# Patient Record
Sex: MALE | Race: WHITE
[De-identification: names, ages, dates, MRNs, and addresses within clinical notes are randomized per-mention and may not be internally consistent; named-entity substitution may affect disease eponyms.]

---

## 2019-12-19 ENCOUNTER — HOSPITAL ENCOUNTER (INPATIENT)
Dept: HOSPITAL 26 - MED | Age: 74
LOS: 4 days | Discharge: HOME | DRG: 720 | End: 2019-12-23
Attending: GENERAL PRACTICE | Admitting: GENERAL PRACTICE
Payer: MEDICAID

## 2019-12-19 VITALS — WEIGHT: 170 LBS | BODY MASS INDEX: 27.32 KG/M2 | HEIGHT: 66 IN

## 2019-12-19 VITALS — DIASTOLIC BLOOD PRESSURE: 88 MMHG | SYSTOLIC BLOOD PRESSURE: 133 MMHG

## 2019-12-19 VITALS — DIASTOLIC BLOOD PRESSURE: 34 MMHG | SYSTOLIC BLOOD PRESSURE: 72 MMHG

## 2019-12-19 DIAGNOSIS — G62.9: ICD-10-CM

## 2019-12-19 DIAGNOSIS — A41.9: Primary | ICD-10-CM

## 2019-12-19 DIAGNOSIS — I48.91: ICD-10-CM

## 2019-12-19 DIAGNOSIS — D68.9: ICD-10-CM

## 2019-12-19 DIAGNOSIS — T45.515A: ICD-10-CM
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
## 2019-12-19 DIAGNOSIS — Z87.891: ICD-10-CM

## 2019-12-19 DIAGNOSIS — E83.41: ICD-10-CM

## 2019-12-19 DIAGNOSIS — E02: ICD-10-CM

## 2019-12-19 DIAGNOSIS — I34.0: ICD-10-CM

## 2019-12-19 DIAGNOSIS — N18.3: ICD-10-CM

## 2019-12-19 DIAGNOSIS — J69.0: ICD-10-CM

## 2019-12-19 DIAGNOSIS — E83.42: ICD-10-CM

## 2019-12-19 DIAGNOSIS — K76.0: ICD-10-CM

## 2019-12-19 DIAGNOSIS — J96.21: ICD-10-CM

## 2019-12-19 DIAGNOSIS — N17.0: ICD-10-CM

## 2019-12-19 DIAGNOSIS — I13.0: ICD-10-CM

## 2019-12-19 DIAGNOSIS — I50.43: ICD-10-CM

## 2019-12-19 DIAGNOSIS — J96.22: ICD-10-CM

## 2019-12-19 LAB
ALBUMIN FLD-MCNC: 3.5 G/DL (ref 3.4–5)
ANION GAP SERPL CALCULATED.3IONS-SCNC: 15 MMOL/L (ref 8–16)
AST SERPL-CCNC: 112 U/L (ref 15–37)
BASOPHILS # BLD AUTO: 0 K/UL (ref 0–0.22)
BASOPHILS NFR BLD AUTO: 0.5 % (ref 0–2)
BILIRUB SERPL-MCNC: 0.9 MG/DL (ref 0–1)
BUN SERPL-MCNC: 31 MG/DL (ref 7–18)
CHLORIDE SERPL-SCNC: 100 MMOL/L (ref 98–107)
CHOLEST/HDLC SERPL: 2.4 {RATIO} (ref 1–4.5)
CO2 SERPL-SCNC: 25.8 MMOL/L (ref 21–32)
CREAT SERPL-MCNC: 1.8 MG/DL (ref 0.7–1.3)
EOSINOPHIL # BLD AUTO: 0 K/UL (ref 0–0.4)
EOSINOPHIL NFR BLD AUTO: 0.1 % (ref 0–4)
ERYTHROCYTE [DISTWIDTH] IN BLOOD BY AUTOMATED COUNT: 13.8 % (ref 11.6–13.7)
GFR SERPL CREATININE-BSD FRML MDRD: (no result) ML/MIN (ref 90–?)
GLUCOSE SERPL-MCNC: 118 MG/DL (ref 74–106)
HCT VFR BLD AUTO: 45.3 % (ref 36–52)
HDLC SERPL-MCNC: 60 MG/DL (ref 40–60)
HGB BLD-MCNC: 14.8 G/DL (ref 12–18)
LDLC SERPL CALC-MCNC: 68 MG/DL (ref 60–100)
LIPASE SERPL-CCNC: 68 U/L (ref 73–393)
LYMPHOCYTES # BLD AUTO: 1.5 K/UL (ref 2–11.5)
LYMPHOCYTES NFR BLD AUTO: 21.5 % (ref 20.5–51.1)
MAGNESIUM SERPL-MCNC: 2.6 MG/DL (ref 1.8–2.4)
MCH RBC QN AUTO: 29 PG (ref 27–31)
MCHC RBC AUTO-ENTMCNC: 33 G/DL (ref 33–37)
MCV RBC AUTO: 89.9 FL (ref 80–94)
MONOCYTES # BLD AUTO: 0.7 K/UL (ref 0.8–1)
MONOCYTES NFR BLD AUTO: 10.2 % (ref 1.7–9.3)
NEUTROPHILS # BLD AUTO: 4.8 K/UL (ref 1.8–7.7)
NEUTROPHILS NFR BLD AUTO: 67.7 % (ref 42.2–75.2)
PHOSPHATE SERPL-MCNC: 3.9 MG/DL (ref 2.5–4.9)
PLATELET # BLD AUTO: 178 K/UL (ref 140–450)
POTASSIUM SERPL-SCNC: 4.8 MMOL/L (ref 3.5–5.1)
PROTHROMBIN TIME: 46.8 SECS (ref 10.8–13.4)
RBC # BLD AUTO: 5.04 MIL/UL (ref 4.2–6.1)
SODIUM SERPL-SCNC: 136 MMOL/L (ref 136–145)
TRIGL SERPL-MCNC: 88 MG/DL (ref 30–150)
TSH SERPL DL<=0.05 MIU/L-ACNC: 6.02 UIU/ML (ref 0.34–3.74)
WBC # BLD AUTO: 7 K/UL (ref 4.8–10.8)

## 2019-12-19 PROCEDURE — C1751 CATH, INF, PER/CENT/MIDLINE: HCPCS

## 2019-12-19 NOTE — NUR
BP-99/76, HR-76, SAT-99%, DR LAGUNA MADE AWARE, STATED WILL ORDER LASIX IVP X1 DOSE, PT 
CONTINUE TO BE TACHYPNEIC, PT DIAPHORETIC WITH COLD AND CLAMMY EXTREMITIES, MONITORED 
CLOSELY.

## 2019-12-19 NOTE — NUR
DR. GREY AWARE OF PT HAS POSSIBLE HF AND IS ON LASIX OUTPATIENT, CONFIRMED TO 
CONTINUE W/ 1L BOLUS.

-------------------------------------------------------------------------------

Addendum: 12/19/19 at 2036 by CARLEY

-------------------------------------------------------------------------------

NOTIFIED DR. RICO PT REPORTS ON LASIX OUTPATIENT AND WITH POSSIBLE HF (PT 
UNSURE), CONFIRMED W/  TO CONTINUE W/ 1L BOLUS.

## 2019-12-19 NOTE — NUR
ADMITTED THIS 73 YEAR OLD MALE FROM ER [facility] WITH CC OF SOB, AAOX4, VERBALLY RESPONSIVE, 
UNABLE TO AMBULATE TO BED DUE TO WEAKNESS AND SOB, VITAL SIGNS TAKEN:BP-72/34, RR-34, 
SAT-96%, P-74, PT WITH SOB, UNABLE TO COMPLETE A SENTENCE, RESTLESS, PUT ON FOWLERS 
POSITION, O2 AT 4L/NC, HX OBTAINED FROM SON GOKUL AT BEDSIDE, DR LAGUNA AND CHARGE NURSE 
NAMRATA MADE AWARE OF LATEST VITAL SIGNS, SAFETY MEASURES IN PLACE, CALL LIGHT WITHIN REACH.

## 2019-12-19 NOTE — NUR
73/M C/O SOB X 3 DAYS, WITH DRY COUGH. DENIES CHEST PAIN OR PAIN ANYWHERE ELSE. 
STATES SAME SOB SYMPTOMS HAVE OCCURRED ONCE BEFORE. 

OCCASIONAL SOFT WHEEZES NOTED IN BL LUNG MITCHELL. NAD AT THIS TIME. 97% ON RA. 





HX- HF (?), ABN HEART RHYTHM (?) PT UNSURE

RX- LASIX, COUMADIN

## 2019-12-19 NOTE — NUR
PTS HEART RATE DECREASED TO 74 AFTER MEDICATION. PT CALM AND PLEASANT. RR EVEN 
AND UNLABORED. DENIES PAIN AT THIS TIME. VSS.

## 2019-12-19 NOTE — NUR
1 L NS BOLUS STARTED AS ORDERED, PT STILL TACHYPNEIC WITH LABORED BREATHING, SAT-100%, 
DENIES ANY CHEST PAIN, MONITORED CLOSELY.

## 2019-12-19 NOTE — NUR
PT IS RESTLESS. SON AND RN AT BEDSIDE. BIPAP ON PER MD LAGUNA ORDER. SETTINGS AS CHARTED. 
BIPAP CONNECTED TO RED OUTLET. HHN TX GIVEN WITH NO ADVERSDE REACTION. SKIN INTACT. PT IS 
SOB. RT AT BEDSIDE MONITORING CLOSELY.

## 2019-12-19 NOTE — NUR
Patient will be admitted to care of DR GRIFFITH. Admited to TELE.  Will go to room 
120A. Belongings list completed.  Report to ISA ATKINSON.

## 2019-12-19 NOTE — NUR
RT PRIETO PUT PT ON BIPAP, PT ANXIOUS AND RESTLESS, MEDICATED PRN WITH ATIVAN IVP, VITAL 
SIGNS TAKEN:BP-113/33, HR-86, RR-34, CANNOT OBTAINED SAT AT THIS TIME, FINGERS IS VERY COLD 
AND CLAMMY, MONITORED CLOSELY.

## 2019-12-19 NOTE — NUR
NOTIFIED DR. GREY HR RANGING 120-140S, APPEARS TO BE AFIB ON BEDSIDE MONITOR, 
HX POSSIBLE ABN HEART RHYTHM ON COUMADIN OUTPATIENT.

## 2019-12-20 VITALS — DIASTOLIC BLOOD PRESSURE: 84 MMHG | SYSTOLIC BLOOD PRESSURE: 133 MMHG

## 2019-12-20 VITALS — DIASTOLIC BLOOD PRESSURE: 73 MMHG | SYSTOLIC BLOOD PRESSURE: 115 MMHG

## 2019-12-20 VITALS — DIASTOLIC BLOOD PRESSURE: 81 MMHG | SYSTOLIC BLOOD PRESSURE: 124 MMHG

## 2019-12-20 VITALS — SYSTOLIC BLOOD PRESSURE: 108 MMHG | DIASTOLIC BLOOD PRESSURE: 75 MMHG

## 2019-12-20 VITALS — SYSTOLIC BLOOD PRESSURE: 111 MMHG | DIASTOLIC BLOOD PRESSURE: 87 MMHG

## 2019-12-20 VITALS — DIASTOLIC BLOOD PRESSURE: 75 MMHG | SYSTOLIC BLOOD PRESSURE: 108 MMHG

## 2019-12-20 VITALS — SYSTOLIC BLOOD PRESSURE: 99 MMHG | DIASTOLIC BLOOD PRESSURE: 79 MMHG

## 2019-12-20 VITALS — DIASTOLIC BLOOD PRESSURE: 66 MMHG | SYSTOLIC BLOOD PRESSURE: 99 MMHG

## 2019-12-20 VITALS — SYSTOLIC BLOOD PRESSURE: 119 MMHG | DIASTOLIC BLOOD PRESSURE: 68 MMHG

## 2019-12-20 VITALS — DIASTOLIC BLOOD PRESSURE: 84 MMHG | SYSTOLIC BLOOD PRESSURE: 123 MMHG

## 2019-12-20 VITALS — SYSTOLIC BLOOD PRESSURE: 131 MMHG | DIASTOLIC BLOOD PRESSURE: 95 MMHG

## 2019-12-20 VITALS — DIASTOLIC BLOOD PRESSURE: 88 MMHG | SYSTOLIC BLOOD PRESSURE: 103 MMHG

## 2019-12-20 VITALS — SYSTOLIC BLOOD PRESSURE: 115 MMHG | DIASTOLIC BLOOD PRESSURE: 65 MMHG

## 2019-12-20 VITALS — SYSTOLIC BLOOD PRESSURE: 110 MMHG | DIASTOLIC BLOOD PRESSURE: 85 MMHG

## 2019-12-20 VITALS — SYSTOLIC BLOOD PRESSURE: 111 MMHG | DIASTOLIC BLOOD PRESSURE: 63 MMHG

## 2019-12-20 VITALS — DIASTOLIC BLOOD PRESSURE: 82 MMHG | SYSTOLIC BLOOD PRESSURE: 112 MMHG

## 2019-12-20 VITALS — SYSTOLIC BLOOD PRESSURE: 94 MMHG | DIASTOLIC BLOOD PRESSURE: 62 MMHG

## 2019-12-20 VITALS — SYSTOLIC BLOOD PRESSURE: 94 MMHG | DIASTOLIC BLOOD PRESSURE: 64 MMHG

## 2019-12-20 VITALS — SYSTOLIC BLOOD PRESSURE: 83 MMHG | DIASTOLIC BLOOD PRESSURE: 54 MMHG

## 2019-12-20 VITALS — DIASTOLIC BLOOD PRESSURE: 87 MMHG | SYSTOLIC BLOOD PRESSURE: 111 MMHG

## 2019-12-20 VITALS — SYSTOLIC BLOOD PRESSURE: 123 MMHG | DIASTOLIC BLOOD PRESSURE: 83 MMHG

## 2019-12-20 VITALS — DIASTOLIC BLOOD PRESSURE: 33 MMHG | SYSTOLIC BLOOD PRESSURE: 113 MMHG

## 2019-12-20 VITALS — DIASTOLIC BLOOD PRESSURE: 86 MMHG | SYSTOLIC BLOOD PRESSURE: 100 MMHG

## 2019-12-20 VITALS — SYSTOLIC BLOOD PRESSURE: 115 MMHG | DIASTOLIC BLOOD PRESSURE: 78 MMHG

## 2019-12-20 VITALS — DIASTOLIC BLOOD PRESSURE: 77 MMHG | SYSTOLIC BLOOD PRESSURE: 119 MMHG

## 2019-12-20 VITALS — DIASTOLIC BLOOD PRESSURE: 80 MMHG | SYSTOLIC BLOOD PRESSURE: 115 MMHG

## 2019-12-20 VITALS — SYSTOLIC BLOOD PRESSURE: 135 MMHG | DIASTOLIC BLOOD PRESSURE: 80 MMHG

## 2019-12-20 VITALS — SYSTOLIC BLOOD PRESSURE: 104 MMHG | DIASTOLIC BLOOD PRESSURE: 75 MMHG

## 2019-12-20 VITALS — DIASTOLIC BLOOD PRESSURE: 72 MMHG | SYSTOLIC BLOOD PRESSURE: 115 MMHG

## 2019-12-20 VITALS — DIASTOLIC BLOOD PRESSURE: 79 MMHG | SYSTOLIC BLOOD PRESSURE: 104 MMHG

## 2019-12-20 VITALS — SYSTOLIC BLOOD PRESSURE: 109 MMHG | DIASTOLIC BLOOD PRESSURE: 59 MMHG

## 2019-12-20 VITALS — SYSTOLIC BLOOD PRESSURE: 137 MMHG | DIASTOLIC BLOOD PRESSURE: 100 MMHG

## 2019-12-20 VITALS — SYSTOLIC BLOOD PRESSURE: 109 MMHG | DIASTOLIC BLOOD PRESSURE: 80 MMHG

## 2019-12-20 VITALS — DIASTOLIC BLOOD PRESSURE: 79 MMHG | SYSTOLIC BLOOD PRESSURE: 115 MMHG

## 2019-12-20 VITALS — SYSTOLIC BLOOD PRESSURE: 101 MMHG | DIASTOLIC BLOOD PRESSURE: 79 MMHG

## 2019-12-20 VITALS — DIASTOLIC BLOOD PRESSURE: 62 MMHG | SYSTOLIC BLOOD PRESSURE: 98 MMHG

## 2019-12-20 VITALS — SYSTOLIC BLOOD PRESSURE: 108 MMHG | DIASTOLIC BLOOD PRESSURE: 88 MMHG

## 2019-12-20 VITALS — SYSTOLIC BLOOD PRESSURE: 107 MMHG | DIASTOLIC BLOOD PRESSURE: 76 MMHG

## 2019-12-20 VITALS — DIASTOLIC BLOOD PRESSURE: 54 MMHG | SYSTOLIC BLOOD PRESSURE: 96 MMHG

## 2019-12-20 VITALS — DIASTOLIC BLOOD PRESSURE: 83 MMHG | SYSTOLIC BLOOD PRESSURE: 120 MMHG

## 2019-12-20 VITALS — SYSTOLIC BLOOD PRESSURE: 110 MMHG | DIASTOLIC BLOOD PRESSURE: 78 MMHG

## 2019-12-20 VITALS — DIASTOLIC BLOOD PRESSURE: 60 MMHG | SYSTOLIC BLOOD PRESSURE: 92 MMHG

## 2019-12-20 VITALS — DIASTOLIC BLOOD PRESSURE: 80 MMHG | SYSTOLIC BLOOD PRESSURE: 133 MMHG

## 2019-12-20 VITALS — DIASTOLIC BLOOD PRESSURE: 61 MMHG | SYSTOLIC BLOOD PRESSURE: 85 MMHG

## 2019-12-20 VITALS — DIASTOLIC BLOOD PRESSURE: 76 MMHG | SYSTOLIC BLOOD PRESSURE: 124 MMHG

## 2019-12-20 LAB
AMORPH SED URNS QL MICRO: (no result) /HPF
ANION GAP SERPL CALCULATED.3IONS-SCNC: 15.5 MMOL/L (ref 8–16)
ANION GAP SERPL CALCULATED.3IONS-SCNC: 17 MMOL/L (ref 8–16)
APPEARANCE UR: CLEAR
BASOPHILS # BLD AUTO: 0 K/UL (ref 0–0.22)
BASOPHILS NFR BLD AUTO: 0 % (ref 0–2)
BILIRUB UR QL STRIP: NEGATIVE
BUN SERPL-MCNC: 36 MG/DL (ref 7–18)
BUN SERPL-MCNC: 44 MG/DL (ref 7–18)
CHLORIDE SERPL-SCNC: 103 MMOL/L (ref 98–107)
CHLORIDE SERPL-SCNC: 105 MMOL/L (ref 98–107)
CO2 SERPL-SCNC: 22.5 MMOL/L (ref 21–32)
CO2 SERPL-SCNC: 23.5 MMOL/L (ref 21–32)
COLOR UR: YELLOW
CREAT SERPL-MCNC: 2.1 MG/DL (ref 0.7–1.3)
CREAT SERPL-MCNC: 2.1 MG/DL (ref 0.7–1.3)
EOSINOPHIL # BLD AUTO: 0 K/UL (ref 0–0.4)
EOSINOPHIL NFR BLD AUTO: 0.1 % (ref 0–4)
ERYTHROCYTE [DISTWIDTH] IN BLOOD BY AUTOMATED COUNT: 14.4 % (ref 11.6–13.7)
GFR SERPL CREATININE-BSD FRML MDRD: (no result) ML/MIN (ref 90–?)
GFR SERPL CREATININE-BSD FRML MDRD: (no result) ML/MIN (ref 90–?)
GLUCOSE SERPL-MCNC: 109 MG/DL (ref 74–106)
GLUCOSE SERPL-MCNC: 112 MG/DL (ref 74–106)
GLUCOSE UR STRIP-MCNC: NEGATIVE MG/DL
HCT VFR BLD AUTO: 43.8 % (ref 36–52)
HGB BLD-MCNC: 14.1 G/DL (ref 12–18)
HGB UR QL STRIP: (no result)
HYALINE CASTS URNS QL MICRO: (no result) /LPF
LEUKOCYTE ESTERASE UR QL STRIP: NEGATIVE
LYMPHOCYTES # BLD AUTO: 1.1 K/UL (ref 2–11.5)
LYMPHOCYTES NFR BLD AUTO: 11.9 % (ref 20.5–51.1)
MAGNESIUM SERPL-MCNC: 2.4 MG/DL (ref 1.8–2.4)
MCH RBC QN AUTO: 30 PG (ref 27–31)
MCHC RBC AUTO-ENTMCNC: 32 G/DL (ref 33–37)
MCV RBC AUTO: 92.7 FL (ref 80–94)
MONOCYTES # BLD AUTO: 1.1 K/UL (ref 0.8–1)
MONOCYTES NFR BLD AUTO: 10.9 % (ref 1.7–9.3)
NEUTROPHILS # BLD AUTO: 7.4 K/UL (ref 1.8–7.7)
NEUTROPHILS NFR BLD AUTO: 77.1 % (ref 42.2–75.2)
NITRITE UR QL STRIP: NEGATIVE
PH UR STRIP: 5.5 [PH] (ref 5–9)
PHOSPHATE SERPL-MCNC: 4.9 MG/DL (ref 2.5–4.9)
PLATELET # BLD AUTO: 154 K/UL (ref 140–450)
POTASSIUM SERPL-SCNC: 4 MMOL/L (ref 3.5–5.1)
POTASSIUM SERPL-SCNC: 4.5 MMOL/L (ref 3.5–5.1)
PROTHROMBIN TIME: 45.3 SECS (ref 10.8–13.4)
RBC # BLD AUTO: 4.72 MIL/UL (ref 4.2–6.1)
RBC #/AREA URNS HPF: (no result) /HPF (ref 0–5)
SODIUM SERPL-SCNC: 139 MMOL/L (ref 136–145)
SODIUM SERPL-SCNC: 139 MMOL/L (ref 136–145)
WBC # BLD AUTO: 9.6 K/UL (ref 4.8–10.8)
WBC,URINE: (no result) /HPF (ref 0–5)

## 2019-12-20 PROCEDURE — 02HV33Z INSERTION OF INFUSION DEVICE INTO SUPERIOR VENA CAVA, PERCUTANEOUS APPROACH: ICD-10-PCS | Performed by: GENERAL PRACTICE

## 2019-12-20 PROCEDURE — B548ZZA ULTRASONOGRAPHY OF SUPERIOR VENA CAVA, GUIDANCE: ICD-10-PCS | Performed by: GENERAL PRACTICE

## 2019-12-20 PROCEDURE — 5A09357 ASSISTANCE WITH RESPIRATORY VENTILATION, LESS THAN 24 CONSECUTIVE HOURS, CONTINUOUS POSITIVE AIRWAY PRESSURE: ICD-10-PCS

## 2019-12-20 RX ADMIN — FUROSEMIDE SCH MG: 10 INJECTION, SOLUTION INTRAMUSCULAR; INTRAVENOUS at 21:29

## 2019-12-20 RX ADMIN — GABAPENTIN SCH MG: 300 CAPSULE ORAL at 08:52

## 2019-12-20 RX ADMIN — DEXTROSE SCH MLS/HR: 50 INJECTION, SOLUTION INTRAVENOUS at 08:53

## 2019-12-20 RX ADMIN — IPRATROPIUM BROMIDE AND ALBUTEROL SULFATE SCH ML: .5; 3 SOLUTION RESPIRATORY (INHALATION) at 19:27

## 2019-12-20 RX ADMIN — IPRATROPIUM BROMIDE AND ALBUTEROL SULFATE SCH ML: .5; 3 SOLUTION RESPIRATORY (INHALATION) at 12:53

## 2019-12-20 RX ADMIN — SODIUM CHLORIDE SCH MLS/HR: 9 INJECTION, SOLUTION INTRAVENOUS at 00:06

## 2019-12-20 RX ADMIN — DEXTROSE SCH MLS/HR: 50 INJECTION, SOLUTION INTRAVENOUS at 02:54

## 2019-12-20 RX ADMIN — Medication SCH EACH: at 08:52

## 2019-12-20 RX ADMIN — SODIUM CHLORIDE SCH MLS/HR: 9 INJECTION, SOLUTION INTRAVENOUS at 16:42

## 2019-12-20 NOTE — NUR
RECIVED PT ON BIPAP WITH SETTINGS AS CHARTED BREATH SOUNDS PRESENT BILAT CLEAR TP AWAKE 
ALERT REMOVED BIPAP AND PLACED ON 2LPM NC DR AT BEDSIDE PT OK ON 2LPM SPO2 .98 0 RESP 
DISTRESS WILL CONTINUE TO MONITOR

## 2019-12-20 NOTE — NUR
RECEIVED REPORT PATIENT IS SEMI FOWLERS POSITION AWAKE ALERT AND ORIENTED PATIENT IS IN 
UNCONTROLLED ATRIAL FIB PATIENT BLOOD PRESSURE IS HOLDING ABOVE 100 SYSTOLIC  AND PATIENT 
HAS BEEN STARTED ON AMMIODARONE DRIP AT [facility] TIME IS ON 1 MG/HR.PATIENT LOOKS SHORT OF 
BREATHE AND APPEARING ANXIOUS.PATIENT RESPIRATION LOOKS ABOURED  AND PATIENT IS 
TACHYPNEIC.DR LAGUNA ORDERED DIGOXIN 0.5MG IVP.PATIENT DAUGHRE AT BEDSIDE.PATIENT IS FOR 
PICC LINE INSERTION.PATIENT SIGNED CONSENT.

## 2019-12-20 NOTE — NUR
PT ARRIVED IN THE UNIT FROM [facility]. CONFUSED. TRANSFERRED TO BED AND CONNECTED TO BEDSIDE 
MONITOR. HR AROUND 90S AND WAS AFIB ON THE MONITOR. S1+S2 HEARD. PULSES PALPABLE IN ALL 
EXTREMITIES. LUNG SOUND AUSCULTATED. WHEEZING HEARD. RESPIRATIONS ARE LABORED. PT ON BIPAP 
WITH SETTINGS 10/5, RATE 16 AND FIO2 40%. OXYGEN SATURATION AT HIGH 90S. RECEIVED PT WITH 
PERIPHERAL IV ACCESS ON LEFT AC 20G. LINE IS PATENT, INTACT AND ASYMPTOMATIC. DRESSING IS 
CLEAN. PT HAS NS RUNNING AT 30ML/HR. LINENS AND PT'S GOWN WERE CHANGED. KEPT HOB AT 30 
DEGREES. ALL SAFETY PRECAUTIONS ARE IN PLACE. WILL CONTINUE TO MONITOR PT.

## 2019-12-20 NOTE — NUR
PATIENT SIGNED CONSENT FOR PICC LINE .PATIENT RECEIVED EXTRA DOSE OF LASIX AS ORDERED BY DR CASTELLON.

## 2019-12-20 NOTE — NUR
CALLED [facility] -[phone]

-------------------------------------------------------------------------------

Addendum: 12/20/19 at 1942 by Debby Valles RN

-------------------------------------------------------------------------------

CALLED [facility] -[phone] BY GÓMEZ SHER AT 1905; ENDORSE TO DEBBY BOLANOS

## 2019-12-20 NOTE — NUR
PT TRANSFERRED TO ICU VIA BED ACCOMPANIED BY RT MOREAU, ON THE WAY PT IS RESTLESS, TRYING 
TO TAKE OFF BIPAP, REPORT GIVEN TO JONO FOR CONTINUITY OF CARE.

## 2019-12-20 NOTE — NUR
ABG DRAWN BY RT ABDUL. MD GILLETTE AT BEDSIDE WITH MD LAGUNA. NEW SETTINGS AS CHARTED. PT IS 
A LOT CALMER AND ASLEEP. SON AT BEDSIDE. WILL CONTINUE TO MONITOR.

## 2019-12-20 NOTE — NUR
ADDISON CATHETER INSERTED. NO FLASH OF URINE NOTED AT FIRST. AFTER FEW MINUTES, THERE WAS A 
FLASH OF CLEAR AND YELLOW URINE. PT SETTLED IN BED. ALL SAFETY PRECAUTIONS WERE KEPT IN 
PLACE. PT REMAINS AGITATED.

## 2019-12-20 NOTE — NUR
DR MAN MADE AWARE HEART RATE IS 130S-150S.PT ASYMPTOMATIC.ALSO PT CO PAIN AT URINARY 
CATHETER AND WANTED IT OUT. MD SAID OK TO REMOVE

## 2019-12-20 NOTE — NUR
RECEIVED REPORT PATIENT IS AWAKE ALERT ORIENTE AND FOLLOWING COMMAND APPEARING ANXIOUS AND 
SHORT OF BREATHE BUT VERY PLEASANT AND VERY COOPERATIVE,PATIENT IS IN UNCONTROLLED ATRIAL 
FIB RATE /MIN.PATIENT IS SATURATING 99% ON 2 LITERS NASAL CANNULA. PATIENT IS ON 
AMMIODARONE DRIP AT 1 MG/HR TILL 2020 THEN GO DOWN TO 0.5MG/HR FOR 18 HOURS.

DOGOXIN 0.5 MG IVP GIVEN AS ORDERED PATIENT IS TACHYPNEIC ENCOURAGED TO RELAX AND TAKE SLOW 
DEEP BREATHE.

## 2019-12-20 NOTE — NUR
MORNING CARE PROVIDED. PT WAS CONFUSED AND TRYING TO TAKE OFF BIPAP MASK. NOT COOPERATING 
WITH STAFF. PT PULLED OUT IV IN THE PROCESS. PT'S SON WAS AT BEDSIDE HELPING TO CALM PT. IV 
ACCESS SUCCESSFULLY PLACED ON RIGHT AC 20G. IV LINE SECURED IN PLACE. NS CONTINUED RUNNING. 
RATE NOW CHANGED TO 60ML/HR. KEPT ALL SAFETY PRECAUTIONS IN PLACE.

## 2019-12-20 NOTE — NUR
PT RESTLESS SOMETIMES, REMAINED TACHYPNEIC AND LABORED BREATHING, VITAL SIGNS TAKEN, 
BP-83/54, HR-73, RR-36, UNABLE TO READ SAT DUE TO COLD AND CLAMMY FINGERS, DR LAGUNA MADE 
AWARE.

## 2019-12-20 NOTE — NUR
RECEIVED PATIENT ON BED .RIGHT AC 20 GAUGE PIV WITH NS AT 60 ML/H.NO SIGNS OF 
INFILTRATION.ADDISON TO GRAVITY WITH GOOD URINE OUTPUT.PT IS ALERT X 4.ATRIAL FIB ON THE 
MONITOR.PT CAN FOLLOW COMMANDS.PT ON BIPAP WITH SETTING  IPAP=14,EPAP=7,RATE=10,40 PERCENT 
FIO2.WILL MONITOR.

## 2019-12-20 NOTE — NUR
PATIENT HAS BEEN SCREENED AND CATEGORIZED AS HIGH NUTRITION RISK. PATIENT WILL BE SEEN 
WITHIN 1-2 DAYS OF ADMISSION.



12/20/19-12/21/19



JOVITA BAKER RD

## 2019-12-20 NOTE — NUR
12/20/19 RD INITIAL ASSESSMENT COMPLETED



PLEASE REFER TO NUTRITION ASSESSMENT UNDER CARE ACTIVITY FOR ESTIMATED NUTRITIONAL NEEDS. 



1. IF/WHEN MEDICALLY STABLE ADVANCE TO A CARDIAC DIET AS TOLERATED 

2. RD PROVIDED NUTRITION EDUCATION THERAPY FOR CONGESTIVE HEART FAILURE. PT AND 
DAUGHTER-IN-LAW ACCEPTED

3. RD TO FOLLOW-UP 3-5 DAYS, MODERATE RISK 



JOVITA BAKER RD

## 2019-12-21 VITALS — DIASTOLIC BLOOD PRESSURE: 69 MMHG | SYSTOLIC BLOOD PRESSURE: 109 MMHG

## 2019-12-21 VITALS — DIASTOLIC BLOOD PRESSURE: 72 MMHG | SYSTOLIC BLOOD PRESSURE: 101 MMHG

## 2019-12-21 VITALS — DIASTOLIC BLOOD PRESSURE: 61 MMHG | SYSTOLIC BLOOD PRESSURE: 95 MMHG

## 2019-12-21 VITALS — SYSTOLIC BLOOD PRESSURE: 101 MMHG | DIASTOLIC BLOOD PRESSURE: 73 MMHG

## 2019-12-21 VITALS — SYSTOLIC BLOOD PRESSURE: 97 MMHG | DIASTOLIC BLOOD PRESSURE: 50 MMHG

## 2019-12-21 VITALS — SYSTOLIC BLOOD PRESSURE: 102 MMHG | DIASTOLIC BLOOD PRESSURE: 45 MMHG

## 2019-12-21 VITALS — DIASTOLIC BLOOD PRESSURE: 57 MMHG | SYSTOLIC BLOOD PRESSURE: 91 MMHG

## 2019-12-21 VITALS — DIASTOLIC BLOOD PRESSURE: 59 MMHG | SYSTOLIC BLOOD PRESSURE: 89 MMHG

## 2019-12-21 VITALS — SYSTOLIC BLOOD PRESSURE: 100 MMHG | DIASTOLIC BLOOD PRESSURE: 77 MMHG

## 2019-12-21 VITALS — SYSTOLIC BLOOD PRESSURE: 96 MMHG | DIASTOLIC BLOOD PRESSURE: 57 MMHG

## 2019-12-21 VITALS — SYSTOLIC BLOOD PRESSURE: 121 MMHG | DIASTOLIC BLOOD PRESSURE: 87 MMHG

## 2019-12-21 VITALS — SYSTOLIC BLOOD PRESSURE: 99 MMHG | DIASTOLIC BLOOD PRESSURE: 64 MMHG

## 2019-12-21 VITALS — DIASTOLIC BLOOD PRESSURE: 74 MMHG | SYSTOLIC BLOOD PRESSURE: 103 MMHG

## 2019-12-21 VITALS — SYSTOLIC BLOOD PRESSURE: 107 MMHG | DIASTOLIC BLOOD PRESSURE: 78 MMHG

## 2019-12-21 VITALS — SYSTOLIC BLOOD PRESSURE: 98 MMHG | DIASTOLIC BLOOD PRESSURE: 72 MMHG

## 2019-12-21 VITALS — SYSTOLIC BLOOD PRESSURE: 92 MMHG | DIASTOLIC BLOOD PRESSURE: 62 MMHG

## 2019-12-21 VITALS — DIASTOLIC BLOOD PRESSURE: 54 MMHG | SYSTOLIC BLOOD PRESSURE: 89 MMHG

## 2019-12-21 VITALS — SYSTOLIC BLOOD PRESSURE: 104 MMHG | DIASTOLIC BLOOD PRESSURE: 73 MMHG

## 2019-12-21 VITALS — DIASTOLIC BLOOD PRESSURE: 66 MMHG | SYSTOLIC BLOOD PRESSURE: 110 MMHG

## 2019-12-21 VITALS — SYSTOLIC BLOOD PRESSURE: 119 MMHG | DIASTOLIC BLOOD PRESSURE: 80 MMHG

## 2019-12-21 VITALS — DIASTOLIC BLOOD PRESSURE: 69 MMHG | SYSTOLIC BLOOD PRESSURE: 97 MMHG

## 2019-12-21 VITALS — SYSTOLIC BLOOD PRESSURE: 90 MMHG | DIASTOLIC BLOOD PRESSURE: 53 MMHG

## 2019-12-21 VITALS — DIASTOLIC BLOOD PRESSURE: 69 MMHG | SYSTOLIC BLOOD PRESSURE: 104 MMHG

## 2019-12-21 VITALS — SYSTOLIC BLOOD PRESSURE: 107 MMHG | DIASTOLIC BLOOD PRESSURE: 82 MMHG

## 2019-12-21 VITALS — SYSTOLIC BLOOD PRESSURE: 107 MMHG | DIASTOLIC BLOOD PRESSURE: 73 MMHG

## 2019-12-21 VITALS — SYSTOLIC BLOOD PRESSURE: 95 MMHG | DIASTOLIC BLOOD PRESSURE: 60 MMHG

## 2019-12-21 VITALS — DIASTOLIC BLOOD PRESSURE: 56 MMHG | SYSTOLIC BLOOD PRESSURE: 90 MMHG

## 2019-12-21 VITALS — DIASTOLIC BLOOD PRESSURE: 60 MMHG | SYSTOLIC BLOOD PRESSURE: 96 MMHG

## 2019-12-21 VITALS — DIASTOLIC BLOOD PRESSURE: 75 MMHG | SYSTOLIC BLOOD PRESSURE: 106 MMHG

## 2019-12-21 VITALS — SYSTOLIC BLOOD PRESSURE: 96 MMHG | DIASTOLIC BLOOD PRESSURE: 63 MMHG

## 2019-12-21 VITALS — SYSTOLIC BLOOD PRESSURE: 99 MMHG | DIASTOLIC BLOOD PRESSURE: 71 MMHG

## 2019-12-21 VITALS — SYSTOLIC BLOOD PRESSURE: 86 MMHG | DIASTOLIC BLOOD PRESSURE: 66 MMHG

## 2019-12-21 VITALS — DIASTOLIC BLOOD PRESSURE: 66 MMHG | SYSTOLIC BLOOD PRESSURE: 106 MMHG

## 2019-12-21 VITALS — SYSTOLIC BLOOD PRESSURE: 109 MMHG | DIASTOLIC BLOOD PRESSURE: 91 MMHG

## 2019-12-21 VITALS — SYSTOLIC BLOOD PRESSURE: 110 MMHG | DIASTOLIC BLOOD PRESSURE: 72 MMHG

## 2019-12-21 VITALS — SYSTOLIC BLOOD PRESSURE: 107 MMHG | DIASTOLIC BLOOD PRESSURE: 72 MMHG

## 2019-12-21 VITALS — DIASTOLIC BLOOD PRESSURE: 70 MMHG | SYSTOLIC BLOOD PRESSURE: 92 MMHG

## 2019-12-21 VITALS — DIASTOLIC BLOOD PRESSURE: 65 MMHG | SYSTOLIC BLOOD PRESSURE: 114 MMHG

## 2019-12-21 VITALS — SYSTOLIC BLOOD PRESSURE: 87 MMHG | DIASTOLIC BLOOD PRESSURE: 57 MMHG

## 2019-12-21 VITALS — DIASTOLIC BLOOD PRESSURE: 56 MMHG | SYSTOLIC BLOOD PRESSURE: 89 MMHG

## 2019-12-21 VITALS — SYSTOLIC BLOOD PRESSURE: 94 MMHG | DIASTOLIC BLOOD PRESSURE: 65 MMHG

## 2019-12-21 VITALS — SYSTOLIC BLOOD PRESSURE: 91 MMHG | DIASTOLIC BLOOD PRESSURE: 67 MMHG

## 2019-12-21 VITALS — DIASTOLIC BLOOD PRESSURE: 66 MMHG | SYSTOLIC BLOOD PRESSURE: 91 MMHG

## 2019-12-21 VITALS — DIASTOLIC BLOOD PRESSURE: 64 MMHG | SYSTOLIC BLOOD PRESSURE: 103 MMHG

## 2019-12-21 VITALS — DIASTOLIC BLOOD PRESSURE: 61 MMHG | SYSTOLIC BLOOD PRESSURE: 98 MMHG

## 2019-12-21 VITALS — SYSTOLIC BLOOD PRESSURE: 93 MMHG | DIASTOLIC BLOOD PRESSURE: 63 MMHG

## 2019-12-21 VITALS — SYSTOLIC BLOOD PRESSURE: 105 MMHG | DIASTOLIC BLOOD PRESSURE: 74 MMHG

## 2019-12-21 VITALS — DIASTOLIC BLOOD PRESSURE: 64 MMHG | SYSTOLIC BLOOD PRESSURE: 92 MMHG

## 2019-12-21 VITALS — SYSTOLIC BLOOD PRESSURE: 106 MMHG | DIASTOLIC BLOOD PRESSURE: 68 MMHG

## 2019-12-21 VITALS — DIASTOLIC BLOOD PRESSURE: 70 MMHG | SYSTOLIC BLOOD PRESSURE: 104 MMHG

## 2019-12-21 VITALS — SYSTOLIC BLOOD PRESSURE: 99 MMHG | DIASTOLIC BLOOD PRESSURE: 63 MMHG

## 2019-12-21 VITALS — SYSTOLIC BLOOD PRESSURE: 105 MMHG | DIASTOLIC BLOOD PRESSURE: 68 MMHG

## 2019-12-21 VITALS — DIASTOLIC BLOOD PRESSURE: 60 MMHG | SYSTOLIC BLOOD PRESSURE: 92 MMHG

## 2019-12-21 VITALS — DIASTOLIC BLOOD PRESSURE: 61 MMHG | SYSTOLIC BLOOD PRESSURE: 114 MMHG

## 2019-12-21 VITALS — DIASTOLIC BLOOD PRESSURE: 69 MMHG | SYSTOLIC BLOOD PRESSURE: 101 MMHG

## 2019-12-21 VITALS — SYSTOLIC BLOOD PRESSURE: 110 MMHG | DIASTOLIC BLOOD PRESSURE: 81 MMHG

## 2019-12-21 VITALS — SYSTOLIC BLOOD PRESSURE: 98 MMHG | DIASTOLIC BLOOD PRESSURE: 71 MMHG

## 2019-12-21 VITALS — DIASTOLIC BLOOD PRESSURE: 62 MMHG | SYSTOLIC BLOOD PRESSURE: 81 MMHG

## 2019-12-21 VITALS — SYSTOLIC BLOOD PRESSURE: 92 MMHG | DIASTOLIC BLOOD PRESSURE: 58 MMHG

## 2019-12-21 LAB
ANION GAP SERPL CALCULATED.3IONS-SCNC: 13.1 MMOL/L (ref 8–16)
BASOPHILS # BLD AUTO: 0 K/UL (ref 0–0.22)
BASOPHILS NFR BLD AUTO: 0.7 % (ref 0–2)
BUN SERPL-MCNC: 38 MG/DL (ref 7–18)
CHLORIDE SERPL-SCNC: 107 MMOL/L (ref 98–107)
CO2 SERPL-SCNC: 26.6 MMOL/L (ref 21–32)
CREAT SERPL-MCNC: 1.8 MG/DL (ref 0.7–1.3)
EOSINOPHIL # BLD AUTO: 0 K/UL (ref 0–0.4)
EOSINOPHIL NFR BLD AUTO: 0.5 % (ref 0–4)
ERYTHROCYTE [DISTWIDTH] IN BLOOD BY AUTOMATED COUNT: 13.9 % (ref 11.6–13.7)
GFR SERPL CREATININE-BSD FRML MDRD: (no result) ML/MIN (ref 90–?)
GLUCOSE SERPL-MCNC: 85 MG/DL (ref 74–106)
HBV CORE AB SERPL QL IA: POSITIVE
HCT VFR BLD AUTO: 38 % (ref 36–52)
HGB BLD-MCNC: 12.6 G/DL (ref 12–18)
LYMPHOCYTES # BLD AUTO: 1.7 K/UL (ref 2–11.5)
LYMPHOCYTES NFR BLD AUTO: 23.5 % (ref 20.5–51.1)
MAGNESIUM SERPL-MCNC: 2 MG/DL (ref 1.8–2.4)
MCH RBC QN AUTO: 30 PG (ref 27–31)
MCHC RBC AUTO-ENTMCNC: 33 G/DL (ref 33–37)
MCV RBC AUTO: 91 FL (ref 80–94)
MONOCYTES # BLD AUTO: 0.9 K/UL (ref 0.8–1)
MONOCYTES NFR BLD AUTO: 12.5 % (ref 1.7–9.3)
NEUTROPHILS # BLD AUTO: 4.5 K/UL (ref 1.8–7.7)
NEUTROPHILS NFR BLD AUTO: 62.8 % (ref 42.2–75.2)
PHOSPHATE SERPL-MCNC: 3.5 MG/DL (ref 2.5–4.9)
PLATELET # BLD AUTO: 146 K/UL (ref 140–450)
POTASSIUM SERPL-SCNC: 3.7 MMOL/L (ref 3.5–5.1)
PROTHROMBIN TIME: 25.4 SECS (ref 10.8–13.4)
RBC # BLD AUTO: 4.18 MIL/UL (ref 4.2–6.1)
SODIUM SERPL-SCNC: 143 MMOL/L (ref 136–145)
WBC # BLD AUTO: 7.2 K/UL (ref 4.8–10.8)

## 2019-12-21 RX ADMIN — IPRATROPIUM BROMIDE AND ALBUTEROL SULFATE SCH ML: .5; 3 SOLUTION RESPIRATORY (INHALATION) at 06:35

## 2019-12-21 RX ADMIN — METOPROLOL SUCCINATE SCH MG: 50 TABLET, EXTENDED RELEASE ORAL at 09:00

## 2019-12-21 RX ADMIN — IPRATROPIUM BROMIDE AND ALBUTEROL SULFATE SCH ML: .5; 3 SOLUTION RESPIRATORY (INHALATION) at 18:42

## 2019-12-21 RX ADMIN — METOPROLOL SUCCINATE SCH MG: 50 TABLET, EXTENDED RELEASE ORAL at 20:18

## 2019-12-21 RX ADMIN — MUPIROCIN SCH GM: 2 CREAM TOPICAL at 13:00

## 2019-12-21 RX ADMIN — CHLORHEXIDINE GLUCONATE SCH EA: 2 SOLUTION TOPICAL at 13:00

## 2019-12-21 RX ADMIN — SODIUM CHLORIDE SCH MLS/HR: 9 INJECTION, SOLUTION INTRAVENOUS at 07:29

## 2019-12-21 RX ADMIN — FUROSEMIDE SCH MG: 10 INJECTION, SOLUTION INTRAMUSCULAR; INTRAVENOUS at 09:39

## 2019-12-21 RX ADMIN — IPRATROPIUM BROMIDE AND ALBUTEROL SULFATE SCH ML: .5; 3 SOLUTION RESPIRATORY (INHALATION) at 13:11

## 2019-12-21 RX ADMIN — Medication SCH EACH: at 09:40

## 2019-12-21 RX ADMIN — AMIODARONE HYDROCHLORIDE SCH MG: 200 TABLET ORAL at 20:19

## 2019-12-21 RX ADMIN — FUROSEMIDE SCH MG: 10 INJECTION, SOLUTION INTRAMUSCULAR; INTRAVENOUS at 20:19

## 2019-12-21 RX ADMIN — AMIODARONE HYDROCHLORIDE SCH MG: 200 TABLET ORAL at 11:39

## 2019-12-21 RX ADMIN — GABAPENTIN SCH MG: 300 CAPSULE ORAL at 09:39

## 2019-12-21 NOTE — NUR
RECEIVED PT FROM AM SHIFT NURSE. PT IS ORIENTED X4 AWAKE AND ALERT. FOLLOWS COMMANDS. PT ON 
2L NASAL CANNULA. NO SOB NOTED. PICC LINE ON RIGHT UPPER ARM. INTACT AND PATENT. NS INFUSING 
AT 60ML/HR. LUNG SOUNDS CLEAR. ABDOMEN SOFT AND ROUND. ACTIVE BOWEL SOUNDS. SKIN IS INTACT, 
WARM AND DRY. CAP REFILL LESS THAN 2 SECONDS. ABLE TO USE URINAL. DENIES PAIN. HOB 30 
DEGREES. BED LOCKED IN LOWEST POSITION.

## 2019-12-21 NOTE — NUR
RECEIVED PT FROM PM SHIFT RN. PT AWAKE, ALERT. ON O2 NC 2L/MIN. BEDSIDE MONITOR SHOWS SR 
80S. PT HAS IV TO RIGHT UPPER ARM PICC LINE RUNNING 0.9 NS AT 60 CC/HR AND AMIODARONE AT 0.5 
MG/MIN=16.7 CC/ HR. PT ALSO HAS IV TO RIGHT AC , PT C/O PAIN WHEN FLUSH, SO REMOVED THIS 
PERIPHERAL IV. PT ABLE TO MOVE HIS EXTREMITIES. INTRODUCED MYSELF. PT VERBALIZED 
UNDERSTANDING . HOB ELEVATED 30 DEGREES WITH LOW BED POSITION, WILL CONTINUE TO MONITOR.

## 2019-12-21 NOTE — NUR
PATIENT AMMIODARONE HOOKED TO PICC LINE .PICC LINE CLEARED FOR USED.PATIENT RYTHM IS STILL 
ATRIAL FIB BUT CONTROLLED.

## 2019-12-21 NOTE — NUR
RECEIVED PT ON 2L NC WITH SP02 OF 99%. CLEAR/DIMINISHED BREATH SOUNDS. NO RESPIRATORY 
DISTRESS NOTED AT THIS TIME. HHN TX GIVEN AS ORDERED WITH NO ADVERSE REACTION. WILL CONTINUE 
TO MONITOR PT.

## 2019-12-21 NOTE — NUR
PATIENT HEART RATE IS CONTROLLED BUT STILL ATRIAL FIB,AMMIODARONE WILL RUN AT 0.5 MG/HR TILL 
1400 AND PO AMMIODARONE WILL BE STARTED BEFORE AMMIO DRIP IS DCD.PATIENT HAS INTERMITTENT 
COUGHING.

## 2019-12-21 NOTE — NUR
PT AWAKE IN BED, WATCHING TELEVISION. NO SOB NOTED. RESPIRATIONS EVEN AND UNLABORED. WILL 
CONTINUE TO MONITOR. Telephone Encounter by She Gordon MD at 10/12/17 05:00 PM     Author:  She Gordon MD Service:  (none) Author Type:  Physician     Filed:  10/12/17 05:00 PM Encounter Date:  10/10/2017 Status:  Signed     :  She Gordon MD (Physician)            Order CPAP in meantime[LA1.1M]      Revision History        User Key Date/Time User Provider Type Action    > LA1.1 10/12/17 05:00 PM She Gordon MD Physician Sign    M - Manual

## 2019-12-21 NOTE — NUR
RECEIVED PT FROM AM SHIFT RCP. PT ON 2L NC WITH SP02 OF 99%. NO RESPIRATORY DISTRESS NOTED 
AT THIS TIME. BiPAP AT BEDSIDE FOR SOB. CLEAR/DIMINISHED BREATH SOUNDS. HHN TX GIVEN AS 
ORDERED WITH NO ADVERSE REACTION. BVM AT BEDSIDE, HOB>30. AIRWAY IS PATENT. WILL CONTINUE TO 
MONITOR PT

## 2019-12-21 NOTE — NUR
PATIENT HAS GOOD RESPOMNSE FROM LASIX AND AMMIODARONE PLUS EXRA DOSE OF METOPROLOL.PATIENT 
VOID LARGE AMOUNT AND RESPIRATION IS EASIER NOT LABOURED.PATIENT CONTINUE TO SATURATE 
% ON MONITOR NO DISTRESS OBSERVED,PATIENT SON AT BEDSIDE.

## 2019-12-22 VITALS — SYSTOLIC BLOOD PRESSURE: 111 MMHG | DIASTOLIC BLOOD PRESSURE: 72 MMHG

## 2019-12-22 VITALS — DIASTOLIC BLOOD PRESSURE: 71 MMHG | SYSTOLIC BLOOD PRESSURE: 116 MMHG

## 2019-12-22 VITALS — SYSTOLIC BLOOD PRESSURE: 128 MMHG | DIASTOLIC BLOOD PRESSURE: 83 MMHG

## 2019-12-22 VITALS — DIASTOLIC BLOOD PRESSURE: 57 MMHG | SYSTOLIC BLOOD PRESSURE: 84 MMHG

## 2019-12-22 VITALS — SYSTOLIC BLOOD PRESSURE: 117 MMHG | DIASTOLIC BLOOD PRESSURE: 71 MMHG

## 2019-12-22 VITALS — DIASTOLIC BLOOD PRESSURE: 70 MMHG | SYSTOLIC BLOOD PRESSURE: 106 MMHG

## 2019-12-22 VITALS — DIASTOLIC BLOOD PRESSURE: 67 MMHG | SYSTOLIC BLOOD PRESSURE: 101 MMHG

## 2019-12-22 VITALS — DIASTOLIC BLOOD PRESSURE: 64 MMHG | SYSTOLIC BLOOD PRESSURE: 117 MMHG

## 2019-12-22 VITALS — DIASTOLIC BLOOD PRESSURE: 70 MMHG | SYSTOLIC BLOOD PRESSURE: 109 MMHG

## 2019-12-22 VITALS — SYSTOLIC BLOOD PRESSURE: 117 MMHG | DIASTOLIC BLOOD PRESSURE: 64 MMHG

## 2019-12-22 VITALS — DIASTOLIC BLOOD PRESSURE: 73 MMHG | SYSTOLIC BLOOD PRESSURE: 108 MMHG

## 2019-12-22 VITALS — SYSTOLIC BLOOD PRESSURE: 107 MMHG | DIASTOLIC BLOOD PRESSURE: 67 MMHG

## 2019-12-22 LAB
ANION GAP SERPL CALCULATED.3IONS-SCNC: 11.5 MMOL/L (ref 8–16)
BASOPHILS # BLD AUTO: 0 K/UL (ref 0–0.22)
BASOPHILS NFR BLD AUTO: 0.6 % (ref 0–2)
BUN SERPL-MCNC: 26 MG/DL (ref 7–18)
CHLORIDE SERPL-SCNC: 106 MMOL/L (ref 98–107)
CO2 SERPL-SCNC: 27 MMOL/L (ref 21–32)
CREAT SERPL-MCNC: 1.3 MG/DL (ref 0.7–1.3)
EOSINOPHIL # BLD AUTO: 0.1 K/UL (ref 0–0.4)
EOSINOPHIL NFR BLD AUTO: 1.6 % (ref 0–4)
ERYTHROCYTE [DISTWIDTH] IN BLOOD BY AUTOMATED COUNT: 14.2 % (ref 11.6–13.7)
GFR SERPL CREATININE-BSD FRML MDRD: (no result) ML/MIN (ref 90–?)
GLUCOSE SERPL-MCNC: 86 MG/DL (ref 74–106)
HCT VFR BLD AUTO: 38.7 % (ref 36–52)
HGB BLD-MCNC: 12.5 G/DL (ref 12–18)
LYMPHOCYTES # BLD AUTO: 1.4 K/UL (ref 2–11.5)
LYMPHOCYTES NFR BLD AUTO: 20.4 % (ref 20.5–51.1)
MAGNESIUM SERPL-MCNC: 1.7 MG/DL (ref 1.8–2.4)
MCH RBC QN AUTO: 30 PG (ref 27–31)
MCHC RBC AUTO-ENTMCNC: 32 G/DL (ref 33–37)
MCV RBC AUTO: 91.2 FL (ref 80–94)
MONOCYTES # BLD AUTO: 0.9 K/UL (ref 0.8–1)
MONOCYTES NFR BLD AUTO: 13.4 % (ref 1.7–9.3)
NEUTROPHILS # BLD AUTO: 4.4 K/UL (ref 1.8–7.7)
NEUTROPHILS NFR BLD AUTO: 64 % (ref 42.2–75.2)
PHOSPHATE SERPL-MCNC: 2.7 MG/DL (ref 2.5–4.9)
PLATELET # BLD AUTO: 144 K/UL (ref 140–450)
POTASSIUM SERPL-SCNC: 3.5 MMOL/L (ref 3.5–5.1)
RBC # BLD AUTO: 4.24 MIL/UL (ref 4.2–6.1)
SODIUM SERPL-SCNC: 141 MMOL/L (ref 136–145)
WBC # BLD AUTO: 6.9 K/UL (ref 4.8–10.8)

## 2019-12-22 RX ADMIN — SODIUM CHLORIDE SCH MLS/HR: 9 INJECTION, SOLUTION INTRAVENOUS at 01:36

## 2019-12-22 RX ADMIN — MUPIROCIN SCH GM: 2 CREAM TOPICAL at 12:03

## 2019-12-22 RX ADMIN — GUAIFENESIN AND DEXTROMETHORPHAN PRN ML: 100; 10 SYRUP ORAL at 11:56

## 2019-12-22 RX ADMIN — SODIUM CHLORIDE SCH MLS/HR: 9 INJECTION, SOLUTION INTRAVENOUS at 18:13

## 2019-12-22 RX ADMIN — IPRATROPIUM BROMIDE AND ALBUTEROL SULFATE SCH ML: .5; 3 SOLUTION RESPIRATORY (INHALATION) at 19:47

## 2019-12-22 RX ADMIN — FUROSEMIDE SCH MG: 10 INJECTION, SOLUTION INTRAMUSCULAR; INTRAVENOUS at 08:57

## 2019-12-22 RX ADMIN — IPRATROPIUM BROMIDE AND ALBUTEROL SULFATE SCH ML: .5; 3 SOLUTION RESPIRATORY (INHALATION) at 07:06

## 2019-12-22 RX ADMIN — FUROSEMIDE SCH MG: 10 INJECTION, SOLUTION INTRAMUSCULAR; INTRAVENOUS at 20:09

## 2019-12-22 RX ADMIN — Medication SCH EACH: at 08:56

## 2019-12-22 RX ADMIN — AMIODARONE HYDROCHLORIDE SCH MG: 200 TABLET ORAL at 08:55

## 2019-12-22 RX ADMIN — AMIODARONE HYDROCHLORIDE SCH MG: 200 TABLET ORAL at 20:10

## 2019-12-22 RX ADMIN — CHLORHEXIDINE GLUCONATE SCH EA: 2 SOLUTION TOPICAL at 12:03

## 2019-12-22 RX ADMIN — IPRATROPIUM BROMIDE AND ALBUTEROL SULFATE SCH ML: .5; 3 SOLUTION RESPIRATORY (INHALATION) at 13:08

## 2019-12-22 RX ADMIN — GABAPENTIN SCH MG: 300 CAPSULE ORAL at 08:56

## 2019-12-22 RX ADMIN — METOPROLOL SUCCINATE SCH MG: 50 TABLET, EXTENDED RELEASE ORAL at 08:55

## 2019-12-22 NOTE — NUR
PT HAS EYES CLOSED. NO SOB NOTED. RESPIRATIONS EVEN AND UNLABORED. CHEST RISE IS 
SYMMETRICAL. WILL CONTINUE TO MONITOR.

## 2019-12-22 NOTE — NUR
PT HAS EYES CLOSED, RESTING IN BED. PT IS ABLE TO SELF REPOSITION. NO SOB NOTED. 
RESPIRATIONS EVEN AND UNLABORED. WILL CONTINUE TO MONITOR.

## 2019-12-22 NOTE — NUR
PT RECEIVED FROM AM NURSE. PT IS AWAKE AND ALERT, ORIENTED X4. PT IS CURRENTLY SITTING UP IN 
THE CHAIR. REQUESTING TO USE BEDSIDE COMMODE.

## 2019-12-22 NOTE — NUR
PT HAS EYES CLOSED. RESTING IN BED. RESPIRATIONS EVEN AND UNLABORED. NO SOB NOTED. HOB 30 
DEGREES. BED LOCKED IN LOWEST POSITION. WILL CONTINUE TO MONITOR.

## 2019-12-22 NOTE — NUR
RECEIVED PT FROM PM SHIFT RN. PT AWAKE, ALERT. ON O2 NC 2L/MIN. BEDSIDE MONITOR SHOWS AFIB. 
PT HAS IV TO RIGHT UPPER ARM PICC LINE RUNNING 0.9 NS AT 60 CC/HR .PT ABLE TO MOVE HIS 
EXTREMITIES.POC EXPLAINED. PT VERBALIZED UNDERSTANDING . HOB ELEVATED 30 DEGREES WITH LOW 
BED POSITION, WILL CONTINUE TO MONITOR.

## 2019-12-22 NOTE — NUR
RECEIVED PT FROM AM SHIFT RCP. PT ON ROOM AIR WITH SP02 OF 99%. NO RESPIRATORY DISTRESS 
NOTED AT THIS TIME. BiPAP AT BEDSIDE FOR SOB. COARSE BREATH SOUNDS. HHN TX GIVEN AS ORDERED 
WITH NO ADVERSE REACTION. BVM AT BEDSIDE, HOB>30. AIRWAY IS PATENT. WILL CONTINUE TO MONITOR 
PT

## 2019-12-23 VITALS — DIASTOLIC BLOOD PRESSURE: 78 MMHG | SYSTOLIC BLOOD PRESSURE: 113 MMHG

## 2019-12-23 VITALS — SYSTOLIC BLOOD PRESSURE: 144 MMHG | DIASTOLIC BLOOD PRESSURE: 63 MMHG

## 2019-12-23 VITALS — DIASTOLIC BLOOD PRESSURE: 70 MMHG | SYSTOLIC BLOOD PRESSURE: 109 MMHG

## 2019-12-23 VITALS — DIASTOLIC BLOOD PRESSURE: 80 MMHG | SYSTOLIC BLOOD PRESSURE: 107 MMHG

## 2019-12-23 VITALS — SYSTOLIC BLOOD PRESSURE: 107 MMHG | DIASTOLIC BLOOD PRESSURE: 68 MMHG

## 2019-12-23 VITALS — DIASTOLIC BLOOD PRESSURE: 91 MMHG | SYSTOLIC BLOOD PRESSURE: 124 MMHG

## 2019-12-23 LAB
ANION GAP SERPL CALCULATED.3IONS-SCNC: 12 MMOL/L (ref 8–16)
BASOPHILS # BLD AUTO: 0 K/UL (ref 0–0.22)
BASOPHILS NFR BLD AUTO: 0.4 % (ref 0–2)
BUN SERPL-MCNC: 19 MG/DL (ref 7–18)
CHLORIDE SERPL-SCNC: 105 MMOL/L (ref 98–107)
CO2 SERPL-SCNC: 27.6 MMOL/L (ref 21–32)
CREAT SERPL-MCNC: 1.1 MG/DL (ref 0.7–1.3)
EOSINOPHIL # BLD AUTO: 0.1 K/UL (ref 0–0.4)
EOSINOPHIL NFR BLD AUTO: 1.4 % (ref 0–4)
ERYTHROCYTE [DISTWIDTH] IN BLOOD BY AUTOMATED COUNT: 14.2 % (ref 11.6–13.7)
GFR SERPL CREATININE-BSD FRML MDRD: (no result) ML/MIN (ref 90–?)
GLUCOSE SERPL-MCNC: 87 MG/DL (ref 74–106)
HCT VFR BLD AUTO: 40.3 % (ref 36–52)
HGB BLD-MCNC: 13.1 G/DL (ref 12–18)
LYMPHOCYTES # BLD AUTO: 1.6 K/UL (ref 2–11.5)
LYMPHOCYTES NFR BLD AUTO: 24.7 % (ref 20.5–51.1)
MCH RBC QN AUTO: 30 PG (ref 27–31)
MCHC RBC AUTO-ENTMCNC: 33 G/DL (ref 33–37)
MCV RBC AUTO: 91.1 FL (ref 80–94)
MONOCYTES # BLD AUTO: 0.9 K/UL (ref 0.8–1)
MONOCYTES NFR BLD AUTO: 13.3 % (ref 1.7–9.3)
NEUTROPHILS # BLD AUTO: 4 K/UL (ref 1.8–7.7)
NEUTROPHILS NFR BLD AUTO: 60.2 % (ref 42.2–75.2)
PLATELET # BLD AUTO: 118 K/UL (ref 140–450)
POTASSIUM SERPL-SCNC: 3.6 MMOL/L (ref 3.5–5.1)
RBC # BLD AUTO: 4.42 MIL/UL (ref 4.2–6.1)
SODIUM SERPL-SCNC: 141 MMOL/L (ref 136–145)
WBC # BLD AUTO: 6.6 K/UL (ref 4.8–10.8)

## 2019-12-23 RX ADMIN — IPRATROPIUM BROMIDE AND ALBUTEROL SULFATE SCH ML: .5; 3 SOLUTION RESPIRATORY (INHALATION) at 07:21

## 2019-12-23 RX ADMIN — GABAPENTIN SCH MG: 300 CAPSULE ORAL at 10:34

## 2019-12-23 RX ADMIN — AMIODARONE HYDROCHLORIDE SCH MG: 200 TABLET ORAL at 10:33

## 2019-12-23 RX ADMIN — FUROSEMIDE SCH MG: 10 INJECTION, SOLUTION INTRAMUSCULAR; INTRAVENOUS at 10:35

## 2019-12-23 RX ADMIN — CHLORHEXIDINE GLUCONATE SCH EA: 2 SOLUTION TOPICAL at 13:46

## 2019-12-23 RX ADMIN — IPRATROPIUM BROMIDE AND ALBUTEROL SULFATE SCH ML: .5; 3 SOLUTION RESPIRATORY (INHALATION) at 14:03

## 2019-12-23 RX ADMIN — GUAIFENESIN AND DEXTROMETHORPHAN PRN ML: 100; 10 SYRUP ORAL at 13:45

## 2019-12-23 RX ADMIN — MUPIROCIN SCH GM: 2 CREAM TOPICAL at 13:46

## 2019-12-23 RX ADMIN — Medication SCH EACH: at 10:35

## 2019-12-23 RX ADMIN — SODIUM CHLORIDE SCH MLS/HR: 9 INJECTION, SOLUTION INTRAVENOUS at 10:53

## 2019-12-23 NOTE — NUR
PATIENTS SON ARRIVED, PATIENT GATHERED HIS BELONGINGS, ALREADY CHANGED, IV BAND WITH 
DISCONTINUED AT THIS TIME. PIP-LINE WAS ALSO DISCONTINUED AND REMOVED WITH LUMEN IN TACT, NO 
BLEEDING, REDNESS OR SWELLING NOTED. ALL QUESTIONS PERTAINING TO HIS CARE WAS ANSWERED. 
PATIENT IS STABLE, GAIT IS STEADY AND PATIENT REQUESTING TO AMBULATE WITH SON TO THE LOBBY, 
PATIENT ESCORTED TO THE [facility] AND DISCHARGED AT THIS TIME. 

-------------------------------------------------------------------------------

Addendum: 12/23/19 at 1829 by Lisha Cardoza RN

-------------------------------------------------------------------------------

ID BAND WAS DISCONTINUED AT THIS TIME.

## 2019-12-23 NOTE — NUR
PATIENT EDUCATED ON DISCHARGED INSTRUCTIONS,PATIENT VERBALIZED UNDERSTANDING OF PLAN OR 
CARE, HAS AN APPT WITH PCP 12/26/2019. MEDICATIONS WERE SENT TO THE PHARMACY. DISCHARGED 
FORMS WERE SIGNED AT THIS TIME. PATIENT IS CALLING HIS SON FOR PICKUP. WILL BE ON STAND BY 
FOR DISCHARGE WAITING FOR SONS ARRIVAL.

## 2019-12-23 NOTE — NUR
SCHEDULED MEDICATIONS GIVEN TO PATIENT AT THE BEDSIDE. NO DISTRESS NOTED. ACKNOWLEDGED 
DISCHARGE ORDER FROM DR YO, PATIENT CLEANED, ORAL CARE WAS PREFORMED. WILL CONTINUE TO 
MONITOR AND PREPARE FOR DISCHARGE

## 2019-12-23 NOTE — NUR
PT REPOSITIONS HIMSELF IN BED. PT RESTING IN BED. PT AROUSABLE TO NAME. NO SOB NOTED. 
RESPIRATIONS EVEN AND UNLABORED. HOB 30 DEGREES. BED LOCKED IN LOWEST POSITION.

## 2019-12-23 NOTE — NUR
PATIENT REQUESTING MEDICATION FOR COUGH, MILD NON PRODUCTIVE COUGH NOTED. WILL GIVE 
MEDICATION PER PROTOCOL AND CONTINUE TO MONITOR.

## 2019-12-23 NOTE — NUR
PT HAS EYES CLOSED, PT IS SELF REPOSITIONING. NO SOB NOTED. RESPIRATIONS EVEN AND UNLABORED. 
WILL CONTINUE TO MONITOR.